# Patient Record
Sex: FEMALE | Race: WHITE | URBAN - METROPOLITAN AREA
[De-identification: names, ages, dates, MRNs, and addresses within clinical notes are randomized per-mention and may not be internally consistent; named-entity substitution may affect disease eponyms.]

---

## 2022-07-27 ENCOUNTER — OFFICE VISIT (OUTPATIENT)
Dept: URBAN - METROPOLITAN AREA CLINIC 71 | Facility: CLINIC | Age: 82
End: 2022-07-27
Payer: MEDICARE

## 2022-07-27 DIAGNOSIS — H25.813 COMBINED FORMS OF AGE-RELATED CATARACT, BILATERAL: Primary | ICD-10-CM

## 2022-07-27 PROCEDURE — 99204 OFFICE O/P NEW MOD 45 MIN: CPT | Performed by: OPHTHALMOLOGY

## 2022-07-27 PROCEDURE — 92133 CPTRZD OPH DX IMG PST SGM ON: CPT | Performed by: OPHTHALMOLOGY

## 2022-07-27 ASSESSMENT — VISUAL ACUITY
OS: 20/400
OD: 20/100

## 2022-07-27 ASSESSMENT — INTRAOCULAR PRESSURE
OD: 11
OS: 12

## 2022-07-27 NOTE — IMPRESSION/PLAN
Impression: Combined forms of age-related cataract, bilateral: H25.813. Hazy posterior view and OCT scans due to the density of the cataract. No obvious retinopathy. Discussed diagnosis and option of surgery with patient. Plan: Recommending cataract surgery OU. Patient elects to proceed. Aim for -0.25 OU. Will also get retinal clearance due to the poor posterior view. Recommend OS first, but patient requests OD first. 
Return for pre-op with ascan, itrace and OPTOS imaging.

## 2022-09-15 ENCOUNTER — PRE-OPERATIVE VISIT (OUTPATIENT)
Dept: URBAN - METROPOLITAN AREA CLINIC 71 | Facility: CLINIC | Age: 82
End: 2022-09-15
Payer: MEDICARE

## 2022-09-15 DIAGNOSIS — H25.813 COMBINED FORMS OF AGE-RELATED CATARACT, BILATERAL: Primary | ICD-10-CM

## 2022-09-30 ENCOUNTER — OFFICE VISIT (OUTPATIENT)
Dept: URBAN - METROPOLITAN AREA CLINIC 70 | Facility: CLINIC | Age: 82
End: 2022-09-30
Payer: MEDICARE

## 2022-09-30 DIAGNOSIS — H25.813 COMBINED FORMS OF AGE-RELATED CATARACT, BILATERAL: Primary | ICD-10-CM

## 2022-09-30 PROCEDURE — 99204 OFFICE O/P NEW MOD 45 MIN: CPT | Performed by: OPHTHALMOLOGY

## 2022-09-30 PROCEDURE — 92134 CPTRZ OPH DX IMG PST SGM RTA: CPT | Performed by: OPHTHALMOLOGY

## 2022-09-30 ASSESSMENT — INTRAOCULAR PRESSURE
OS: 16
OD: 14

## 2022-09-30 NOTE — IMPRESSION/PLAN
Impression: Combined forms of age-related cataract, bilateral: H25.813. 
 Plan: retina normal okay to proceed with cat IOL

## 2022-10-19 ENCOUNTER — OFFICE VISIT (OUTPATIENT)
Dept: URBAN - METROPOLITAN AREA CLINIC 71 | Facility: CLINIC | Age: 82
End: 2022-10-19
Payer: MEDICARE

## 2022-10-19 DIAGNOSIS — H25.813 COMBINED FORMS OF AGE-RELATED CATARACT, BILATERAL: Primary | ICD-10-CM

## 2022-10-19 PROCEDURE — 99212 OFFICE O/P EST SF 10 MIN: CPT | Performed by: OPHTHALMOLOGY

## 2022-10-19 RX ORDER — KETOROLAC TROMETHAMINE 5 MG/ML
0.5 % SOLUTION OPHTHALMIC
Qty: 5 | Refills: 1 | Status: ACTIVE
Start: 2022-10-19

## 2022-10-19 ASSESSMENT — INTRAOCULAR PRESSURE
OD: 13
OS: 13

## 2022-10-19 NOTE — IMPRESSION/PLAN
Impression: Combined forms of age-related cataract, bilateral: H25.813. Reviewed OPTOS, OCT, ascan and itrace today. Poor imaging due to density of the cataracts. Plan: Heart and lungs clear. R/B/A discussed. Proceed with [facility] with ORA cataract surgery with dextenza. OD first for distance, then OS for distance. Patient voices understanding that cataract surgery is not a guarantee for 20/20 vision and that glasses may be needed after the surgery. Retinal clearance received. Start ketorolac BID for 3 weeks starting the day prior to surgery on the surgical eye.

## 2022-11-17 ENCOUNTER — SURGERY (OUTPATIENT)
Dept: URBAN - METROPOLITAN AREA SURGERY 45 | Facility: SURGERY | Age: 82
End: 2022-11-17
Payer: MEDICARE

## 2022-11-17 ENCOUNTER — SURGERY (OUTPATIENT)
Dept: URBAN - METROPOLITAN AREA SURGERY 44 | Facility: SURGERY | Age: 82
End: 2022-11-17
Payer: MEDICARE

## 2022-11-17 PROCEDURE — 66984 XCAPSL CTRC RMVL W/O ECP: CPT | Performed by: OPHTHALMOLOGY

## 2022-11-17 PROCEDURE — PR1CP PR1CP: CUSTOM | Performed by: OPHTHALMOLOGY

## 2022-11-18 ENCOUNTER — POST-OPERATIVE VISIT (OUTPATIENT)
Dept: URBAN - METROPOLITAN AREA CLINIC 71 | Facility: CLINIC | Age: 82
End: 2022-11-18
Payer: MEDICARE

## 2022-11-18 DIAGNOSIS — Z48.810 ENCOUNTER FOR SURGICAL AFTERCARE FOLLOWING SURGERY ON A SENSE ORGAN: Primary | ICD-10-CM

## 2022-11-18 PROCEDURE — 99024 POSTOP FOLLOW-UP VISIT: CPT | Performed by: OPHTHALMOLOGY

## 2022-11-18 ASSESSMENT — INTRAOCULAR PRESSURE
OD: 20
OD: 13
OS: 12

## 2022-11-18 NOTE — IMPRESSION/PLAN
Impression: S/P Cataract Extraction by phacoemulsification with IOL placement; ORA OD - 1 Day. Healing well. Wound is secure. IOL is well centered. IOP is stable. Vision should continue to improve as the edema resolves. Plan: Continue use of ketorolac BID OD. Return as scheduled next week for a PO recheck.

## 2022-11-23 ENCOUNTER — POST-OPERATIVE VISIT (OUTPATIENT)
Dept: URBAN - METROPOLITAN AREA CLINIC 71 | Facility: CLINIC | Age: 82
End: 2022-11-23
Payer: MEDICARE

## 2022-11-23 DIAGNOSIS — Z48.810 ENCOUNTER FOR SURGICAL AFTERCARE FOLLOWING SURGERY ON A SENSE ORGAN: Primary | ICD-10-CM

## 2022-11-23 PROCEDURE — 99024 POSTOP FOLLOW-UP VISIT: CPT | Performed by: OPHTHALMOLOGY

## 2022-11-23 ASSESSMENT — VISUAL ACUITY: OD: 20/20

## 2022-11-23 ASSESSMENT — INTRAOCULAR PRESSURE
OS: 13
OD: 12

## 2022-11-23 NOTE — IMPRESSION/PLAN
Impression: S/P Cataract Extraction by phacoemulsification with IOL placement; ORA OD - 6 Days. Healing well. IOL well centered. Vision has improved. Plan: Patient is cleared to proceed as scheduled for second eye cataract surgery. Continue use of ketorolac BID.

## 2022-12-02 ENCOUNTER — POST-OPERATIVE VISIT (OUTPATIENT)
Dept: URBAN - METROPOLITAN AREA CLINIC 71 | Facility: CLINIC | Age: 82
End: 2022-12-02
Payer: MEDICARE

## 2022-12-02 DIAGNOSIS — Z96.1 PRESENCE OF INTRAOCULAR LENS: Primary | ICD-10-CM

## 2022-12-02 PROCEDURE — 99024 POSTOP FOLLOW-UP VISIT: CPT | Performed by: OPHTHALMOLOGY

## 2022-12-02 ASSESSMENT — INTRAOCULAR PRESSURE
OS: 21
OS: 18
OD: 9

## 2022-12-02 NOTE — IMPRESSION/PLAN
Impression: S/P Complex Cataract Extraction by phacoemulsification with IOL placement; ORA OS - 1 Day. Healing well. Wound is secure. IOL is well centered. IOP is stable. Vision has improved, but is still blurry. Discussed with patient that there is some corneal edema present which should resolve on it's own and the vision should continue to improve. Plan: Patient to return next week for a PO recheck.

## 2022-12-07 ENCOUNTER — POST-OPERATIVE VISIT (OUTPATIENT)
Dept: URBAN - METROPOLITAN AREA CLINIC 71 | Facility: CLINIC | Age: 82
End: 2022-12-07
Payer: MEDICARE

## 2022-12-07 DIAGNOSIS — Z96.1 PRESENCE OF INTRAOCULAR LENS: Primary | ICD-10-CM

## 2022-12-07 PROCEDURE — 99024 POSTOP FOLLOW-UP VISIT: CPT | Performed by: OPHTHALMOLOGY

## 2022-12-07 ASSESSMENT — INTRAOCULAR PRESSURE
OD: 11
OS: 10

## 2022-12-07 NOTE — IMPRESSION/PLAN
Impression: S/P Complex Cataract Extraction by phacoemulsification with IOL placement; ORA OS - 6 Days. healing well. IOL well centered OU. Vision has improved. Plan: Patient to return in 3 weeks with an optometrist for a refraction and PO recheck. Call if changes in vision occur. Return to our care in 6 months for dilation and OCT testing.

## 2022-12-28 ENCOUNTER — POST-OPERATIVE VISIT (OUTPATIENT)
Dept: URBAN - METROPOLITAN AREA CLINIC 71 | Facility: CLINIC | Age: 82
End: 2022-12-28
Payer: MEDICARE

## 2022-12-28 DIAGNOSIS — H52.4 PRESBYOPIA: Primary | ICD-10-CM

## 2022-12-28 DIAGNOSIS — Z96.1 PRESENCE OF INTRAOCULAR LENS: ICD-10-CM

## 2022-12-28 PROCEDURE — 99024 POSTOP FOLLOW-UP VISIT: CPT

## 2022-12-28 ASSESSMENT — INTRAOCULAR PRESSURE
OS: 10
OD: 11

## 2022-12-28 ASSESSMENT — VISUAL ACUITY
OD: 20/20
OS: 20/20

## 2022-12-28 NOTE — IMPRESSION/PLAN
Impression: S/P Complex Cataract Extraction by phacoemulsification with IOL placement; ORA OS - 27 Days. Presence of intraocular lens  Z96.1. Plan: Finalized and dispensed SRx. Explained that it will take about 2-3 weeks to get adjusted to the glasses and to RTC immediately if any issues with the eyes happen or if any issues with the SRx occurs. Patient expressed understanding. Discussed no restrictions with patient, healing from Cat sx looks good OU.

## 2025-06-10 ENCOUNTER — OFFICE VISIT (OUTPATIENT)
Dept: URBAN - METROPOLITAN AREA CLINIC 71 | Facility: CLINIC | Age: 85
End: 2025-06-10
Payer: MEDICARE

## 2025-06-10 DIAGNOSIS — Z96.1 PRESENCE OF INTRAOCULAR LENS: ICD-10-CM

## 2025-06-10 DIAGNOSIS — H04.123 DRY EYE SYNDROME OF BILATERAL LACRIMAL GLANDS: Primary | ICD-10-CM

## 2025-06-10 PROCEDURE — 99213 OFFICE O/P EST LOW 20 MIN: CPT | Performed by: OPHTHALMOLOGY

## 2025-06-10 ASSESSMENT — INTRAOCULAR PRESSURE
OD: 9
OS: 10